# Patient Record
Sex: MALE | Race: WHITE
[De-identification: names, ages, dates, MRNs, and addresses within clinical notes are randomized per-mention and may not be internally consistent; named-entity substitution may affect disease eponyms.]

---

## 2019-01-10 ENCOUNTER — HOSPITAL ENCOUNTER (EMERGENCY)
Dept: HOSPITAL 91 - FTE | Age: 49
Discharge: HOME | End: 2019-01-10
Payer: MEDICAID

## 2019-01-10 ENCOUNTER — HOSPITAL ENCOUNTER (EMERGENCY)
Dept: HOSPITAL 10 - FTE | Age: 49
Discharge: HOME | End: 2019-01-10
Payer: MEDICAID

## 2019-01-10 VITALS — RESPIRATION RATE: 18 BRPM | DIASTOLIC BLOOD PRESSURE: 77 MMHG | HEART RATE: 84 BPM | SYSTOLIC BLOOD PRESSURE: 127 MMHG

## 2019-01-10 VITALS
BODY MASS INDEX: 33.87 KG/M2 | WEIGHT: 198.42 LBS | HEIGHT: 64 IN | HEIGHT: 64 IN | WEIGHT: 198.42 LBS | BODY MASS INDEX: 33.87 KG/M2

## 2019-01-10 DIAGNOSIS — J81.1: ICD-10-CM

## 2019-01-10 DIAGNOSIS — I50.9: ICD-10-CM

## 2019-01-10 DIAGNOSIS — J06.9: Primary | ICD-10-CM

## 2019-01-10 DIAGNOSIS — Z95.1: ICD-10-CM

## 2019-01-10 DIAGNOSIS — Z87.891: ICD-10-CM

## 2019-01-10 DIAGNOSIS — I11.0: ICD-10-CM

## 2019-01-10 LAB
ADD MAN DIFF?: NO
ALANINE AMINOTRANSFERASE: 35 IU/L (ref 13–69)
ALBUMIN/GLOBULIN RATIO: 1.34
ALBUMIN: 4.7 G/DL (ref 3.3–4.9)
ALKALINE PHOSPHATASE: 55 IU/L (ref 42–121)
ANION GAP: 14 (ref 5–13)
ASPARTATE AMINO TRANSFERASE: 44 IU/L (ref 15–46)
B-TYPE NATRIURETIC PEPTIDE: 2400 PG/ML (ref 0–125)
BASOPHIL #: 0.1 10^3/UL (ref 0–0.1)
BASOPHILS %: 1.2 % (ref 0–2)
BILIRUBIN,DIRECT: 0 MG/DL (ref 0–0.2)
BILIRUBIN,TOTAL: 0.4 MG/DL (ref 0.2–1.3)
BLOOD UREA NITROGEN: 15 MG/DL (ref 7–20)
CALCIUM: 9.2 MG/DL (ref 8.4–10.2)
CARBON DIOXIDE: 27 MMOL/L (ref 21–31)
CHLORIDE: 102 MMOL/L (ref 97–110)
CREATININE: 0.95 MG/DL (ref 0.61–1.24)
EOSINOPHILS #: 0.2 10^3/UL (ref 0–0.5)
EOSINOPHILS %: 2.1 % (ref 0–7)
GLOBULIN: 3.5 G/DL (ref 1.3–3.2)
GLUCOSE: 107 MG/DL (ref 70–220)
HEMATOCRIT: 43.7 % (ref 42–52)
HEMOGLOBIN: 14.3 G/DL (ref 14–18)
IMMATURE GRANS #M: 0.03 10^3/UL (ref 0–0.03)
IMMATURE GRANS % (M): 0.4 % (ref 0–0.43)
LYMPHOCYTES #: 1.6 10^3/UL (ref 0.8–2.9)
LYMPHOCYTES %: 21.5 % (ref 15–51)
MEAN CORPUSCULAR HEMOGLOBIN: 28.4 PG (ref 29–33)
MEAN CORPUSCULAR HGB CONC: 32.7 G/DL (ref 32–37)
MEAN CORPUSCULAR VOLUME: 86.7 FL (ref 82–101)
MEAN PLATELET VOLUME: 9.3 FL (ref 7.4–10.4)
MONOCYTE #: 0.8 10^3/UL (ref 0.3–0.9)
MONOCYTES %: 10.4 % (ref 0–11)
NEUTROPHIL #: 4.9 10^3/UL (ref 1.6–7.5)
NEUTROPHILS %: 64.4 % (ref 39–77)
NUCLEATED RED BLOOD CELLS #: 0 10^3/UL (ref 0–0)
NUCLEATED RED BLOOD CELLS%: 0 /100WBC (ref 0–0)
PLATELET COUNT: 229 10^3/UL (ref 140–415)
POTASSIUM: 4.3 MMOL/L (ref 3.5–5.1)
RED BLOOD COUNT: 5.04 10^6/UL (ref 4.7–6.1)
RED CELL DISTRIBUTION WIDTH: 13.7 % (ref 11.5–14.5)
SODIUM: 143 MMOL/L (ref 135–144)
TOTAL PROTEIN: 8.2 G/DL (ref 6.1–8.1)
TROPONIN-I: < 0.012 NG/ML (ref 0–0.12)
WHITE BLOOD COUNT: 7.6 10^3/UL (ref 4.8–10.8)

## 2019-01-10 PROCEDURE — 99285 EMERGENCY DEPT VISIT HI MDM: CPT

## 2019-01-10 PROCEDURE — 83880 ASSAY OF NATRIURETIC PEPTIDE: CPT

## 2019-01-10 PROCEDURE — 71046 X-RAY EXAM CHEST 2 VIEWS: CPT

## 2019-01-10 PROCEDURE — 93005 ELECTROCARDIOGRAM TRACING: CPT

## 2019-01-10 PROCEDURE — 80053 COMPREHEN METABOLIC PANEL: CPT

## 2019-01-10 PROCEDURE — 85025 COMPLETE CBC W/AUTO DIFF WBC: CPT

## 2019-01-10 PROCEDURE — 84484 ASSAY OF TROPONIN QUANT: CPT

## 2019-01-10 NOTE — ERD
ER Documentation


Chief Complaint


Chief Complaint





flu like symptoms





HPI


48-year-old male presents for flulike symptoms times 2 months.  Patient states 


that he has cough with production of phlegm.  He states that he has subjective 


fevers also.  He has some decreased appetite.  Patient also notes that he is 


having shortness of breath with walking.  Patient does have a history of MI with


history of open heart surgery.  He does however have cardiology following.  


States that he did take NyQuil and Tylenol at home with only mild relief.  No 


other modifying factors noted.  He denies any chest pain, abdominal pain, 


nausea, vomiting.





ROS


All systems reviewed and are negative except as per history of present illness.





Medications


Home Meds


Active Scripts


D-Methorphan Hb/P-Epd HCl/Bpm (Obhgxcsint-Kckkwezhqnn-Vd Syr) 118 Ml Syrup, 5 ML


PO Q4H PRN for COUGH, #1 BOTTLE


   Prov:ELAINE WOLF          1/10/19


Furosemide* (Lasix*) 20 Mg Tablet, 60 MG PO DAILY for chf for 3 Days, #9 TAB


   Prov:ELAINE WOLF          1/10/19


Furosemide* (Furosemide*) 40 Mg Tablet, 40 MG PO DAILY for CHF, #30 TAB


   Prov:ELAINE WOLF          1/10/19





Allergies


Allergies:  


Coded Allergies:  


     No Known Allergy (Unverified , 1/14/19)





PMhx/Soc


History of Surgery:  Yes (cabg w/stent)


Anesthesia Reaction:  No


Hx Neurological Disorder:  No


Hx Respiratory Disorders:  No


Hx Cardiac Disorders:  Yes (chf;htn)


Hx Psychiatric Problems:  No


Hx Miscellaneous Medical Probl:  No


Hx Alcohol Use:  No


Hx Substance Use:  No


Hx Tobacco Use:  No


Smoking Status:  Former smoker





Physical Exam


Vitals





Vital Signs


  Date      Temp  Pulse  Resp  B/P (MAP)   Pulse Ox  O2          O2 Flow    FiO2


Time                                                 Delivery    Rate


   1/10/19  98.2     84    18      127/77        97  Room Air


     21:13                           (94)


   1/10/19  98.7     80    19      119/75        98


     16:53                           (90)





Physical Exam


Const:   No acute distress


Head:   Atraumatic 


Eyes:    Normal Conjunctiva


ENT:    Tympanic membrane intact bilaterally, no bulging TM, no erythema noted, 


nasal mucosa moist without erythema, oral mucosa without erythema, no tonsillar 


exudates.


Neck:           Full range of motion. No meningismus.


Resp:   Clear to auscultation bilaterally, no wheezing


Cardio:   Regular rate and rhythm, no murmurs, no JVD noted, no abdominal bruit,


no lower extremity edema


Abd:    Soft, non tender, non distended. Normal bowel sounds


Skin:   No petechiae or rashes


Ext:    No cyanosis, or edema


Neur:   Awake and alert


Psych:    Normal Mood and Affect


Result Diagram:  


1/10/19 1916                                                                    


           1/10/19 1916





Results 24 hrs





Laboratory Tests


              Test
                                 1/10/19
19:16


              White Blood Count                      7.6 10^3/ul


              Red Blood Count                       5.04 10^6/ul


              Hemoglobin                               14.3 g/dl


              Hematocrit                                  43.7 %


              Mean Corpuscular Volume                    86.7 fl


              Mean Corpuscular Hemoglobin                28.4 pg


              Mean Corpuscular Hemoglobin
Concent     32.7 g/dl 



              Red Cell Distribution Width                 13.7 %


              Platelet Count                         229 10^3/UL


              Mean Platelet Volume                        9.3 fl


              Immature Granulocytes %                    0.400 %


              Neutrophils %                               64.4 %


              Lymphocytes %                               21.5 %


              Monocytes %                                 10.4 %


              Eosinophils %                                2.1 %


              Basophils %                                  1.2 %


              Nucleated Red Blood Cells %            0.0 /100WBC


              Immature Granulocytes #              0.030 10^3/ul


              Neutrophils #                          4.9 10^3/ul


              Lymphocytes #                          1.6 10^3/ul


              Monocytes #                            0.8 10^3/ul


              Eosinophils #                          0.2 10^3/ul


              Basophils #                            0.1 10^3/ul


              Nucleated Red Blood Cells #            0.0 10^3/ul


              Sodium Level                            143 mmol/L


              Potassium Level                         4.3 mmol/L


              Chloride Level                          102 mmol/L


              Carbon Dioxide Level                     27 mmol/L


              Anion Gap                                       14


              Blood Urea Nitrogen                       15 mg/dl


              Creatinine                              0.95 mg/dl


              Est Glomerular Filtrat Rate
mL/min   > 60 mL/min 



              Glucose Level                            107 mg/dl


              Calcium Level                            9.2 mg/dl


              Total Bilirubin                          0.4 mg/dl


              Direct Bilirubin                        0.00 mg/dl


              Indirect Bilirubin                       0.4 mg/dl


              Aspartate Amino Transf
(AST/SGOT)         44 IU/L 



              Alanine Aminotransferase
(ALT/SGPT)       35 IU/L 



              Alkaline Phosphatase                       55 IU/L


              Troponin I                           < 0.012 ng/ml


              B-Type Natriuretic Peptide              2400 PG/ML


              Total Protein                             8.2 g/dl


              Albumin                                   4.7 g/dl


              Globulin                                 3.50 g/dl


              Albumin/Globulin Ratio                        1.34








Procedures/MDM


Medical Decision Making:





Differential diagnosis includes but not limited to upper respiratory infection, 


CHF exacerbation, pneumonia, sepsis. 





Patient appears well on examination.





Given the patient was complaining of shortness of breath with walking basic labs


and BNP as well as troponin was checked.





EKG was unremarkable, there is no ST elevation or T wave changes suggest an 


acute MI


Chest x-ray showed enlarged cardiac silhouette with findings that may suggest 


pulmonary edema





CBC showed no anemia, no elevated WBC to suggest infection


CMP showed normal electrolytes, normal renal and liver function


BNP was 2400


Troponin was negative





The patient had elevated BNP and findings on chest x-ray that suggested pulmon


radha edema patient's lungs were clear to auscultation, there is no lower 


extremity edema, there is no JVD to suggest an acute CHF exacerbation.





Patient was taking Lasix at home for congestive heart failure however he states 


that he has not taken the medication for a few days.  Patient generally takes 40


mg daily of Lasix.  Patient was given a refill for his Lasix.  He was advised to


take 60 mg daily for the next 3 days and continue with the 40 mg daily as 


prescribed by his cardiologist.  Patient encouraged to follow with his 


cardiologist.  





Patient likely has an upper respiratory infection that may have caused a mild 


exacerbation of his congestive heart failure.


Patient felt stable for outpatient management.





Patient advised to follow up with PCP in 1-2 days. Patient advised to return to 


ED for new or worsening symptoms. Patient stable on discharge from the ED.











Disclaimer: Inadvertent spelling and grammatical errors are likely due to 


EHR/dictation software use and do not reflect on the overall quality of patient 


care. Also, please note that the electronic time recorded on this note does not 


necessarily reflect the actual time of the patient encounter.





Departure


Diagnosis:  


   Primary Impression:  


   Upper respiratory infection


   URI type:  unspecified URI  Qualified Codes:  J06.9 - Acute upper respiratory


   infection, unspecified


   Additional Impression:  


   Pulmonary edema


   Chronicity:  chronic  Qualified Codes:  J81.1 - Chronic pulmonary edema


Condition:  Fair


Patient Instructions:  Preventing Common Respiratory Infections


Referrals:  


[facility]


YOU HAVE RECEIVED A MEDICAL SCREENING EXAM AND THE RESULTS INDICATE THAT YOU DO 


NOT HAVE A CONDITION THAT REQUIRES URGENT TREATMENT IN THE EMERGENCY DEPARTMENT.





FURTHER EVALUATION AND TREATMENT OF YOUR CONDITION CAN WAIT UNTIL YOU ARE SEEN 


IN YOUR DOCTORS OFFICE WITHIN THE NEXT 1-2 DAYS. IT IS YOUR RESPONSIBILITY TO 


MAKE AN APPOINTMENT FOR FOLOW-UP CARE.





IF YOU HAVE A PRIMARY DOCTOR


--you should call your primary doctor and schedule an appointment





IF YOU DO NOT HAVE A PRIMARY DOCTOR YOU CAN CALL OUR PHYSICIAN REFERRAL HOTLINE 


AT


 [phone] 





IF YOU CAN NOT AFFORD TO SEE A PHYSICIAN YOU CAN CHOSE FROM THE FOLLOWING 


[facility]





[facility] [phone] [address] [facility] [phone] [address] [facility] [phone] [address] [facility] [phone] [address] [facility] [phone] [address] [facility]. [facility]. [phone] [address]





Additional Instructions:  


Llame al doctor MAANA y naseem mami JAYCE PARA DENTRO DE 1-2 LARA.Dgale a la 


secretaria que nosotros le instruimos hacer esta jayce.Avise o llame si bird 


condicin se empeora antes de la jayce. Regresa aqui si peor o no mejor.











ELAINE WOLF DO                 Wagner 10, 2019 20:58

## 2019-03-06 ENCOUNTER — HOSPITAL ENCOUNTER (INPATIENT)
Dept: HOSPITAL 54 - TELE1 | Age: 49
LOS: 3 days | Discharge: HOME | DRG: 139 | End: 2019-03-09
Attending: NURSE PRACTITIONER | Admitting: NURSE PRACTITIONER
Payer: MEDICAID

## 2019-03-06 VITALS — HEIGHT: 65 IN | BODY MASS INDEX: 30.99 KG/M2 | WEIGHT: 186 LBS

## 2019-03-06 VITALS — SYSTOLIC BLOOD PRESSURE: 120 MMHG | DIASTOLIC BLOOD PRESSURE: 80 MMHG

## 2019-03-06 VITALS — DIASTOLIC BLOOD PRESSURE: 80 MMHG | SYSTOLIC BLOOD PRESSURE: 120 MMHG

## 2019-03-06 DIAGNOSIS — R07.81: ICD-10-CM

## 2019-03-06 DIAGNOSIS — I25.10: ICD-10-CM

## 2019-03-06 DIAGNOSIS — Z95.1: ICD-10-CM

## 2019-03-06 DIAGNOSIS — J15.9: Primary | ICD-10-CM

## 2019-03-06 DIAGNOSIS — I27.20: ICD-10-CM

## 2019-03-06 DIAGNOSIS — I50.23: ICD-10-CM

## 2019-03-06 DIAGNOSIS — E66.9: ICD-10-CM

## 2019-03-06 DIAGNOSIS — I42.9: ICD-10-CM

## 2019-03-06 DIAGNOSIS — E86.0: ICD-10-CM

## 2019-03-06 PROCEDURE — G0378 HOSPITAL OBSERVATION PER HR: HCPCS

## 2019-03-06 RX ADMIN — SODIUM CHLORIDE PRN MLS/HR: 9 INJECTION, SOLUTION INTRAVENOUS at 22:31

## 2019-03-06 NOTE — NUR
TELE RN NOTES 

ADMITTED A48 Y/0 A/OX4  MALE Jordanian  SPEAKING  , ADMITTED  DIRECT FROM  [facility]  
WITH ADMITTING DX OF KERON, Jordanian  SPEAKING , AMBULATORY . PTS IS UNDER MEDICAL SERVICE 
OF ANAYA WHITLEY. ORDERS NOTED AND CARRIED OUT  ALL NEEDS ATTENDED TOO CALL LIGHT WITHIN 
REACH  V/S STABLE AFEBRILE , NO SOB NO DISTRESS NOTED ,BODY CHECK DONE  NOTED WITH LEFT 
EYEBROW ABRAISION, NO BLEEDING NOTED . ALL DUE MEDS GIVEN AS ORDERED,PTS ON R/A  SATING 97% 
ON TELE STATUS  SR AND OCCASIONAL PVC, WITH RIGHT AC AT 20 G INTACT AND PATENT , RIGHT HAND 
GAUGE  20 INTACT AND PATENT .WILL CONTINUE TO MONITOR PTS.PTS IS FULL CODE AND  NKDA  ,WITH 
IVF OF NS AT 75CC/HR  INFUSING WELL .

## 2019-03-07 VITALS — DIASTOLIC BLOOD PRESSURE: 77 MMHG | SYSTOLIC BLOOD PRESSURE: 107 MMHG

## 2019-03-07 VITALS — SYSTOLIC BLOOD PRESSURE: 95 MMHG | DIASTOLIC BLOOD PRESSURE: 65 MMHG

## 2019-03-07 VITALS — SYSTOLIC BLOOD PRESSURE: 120 MMHG | DIASTOLIC BLOOD PRESSURE: 74 MMHG

## 2019-03-07 VITALS — DIASTOLIC BLOOD PRESSURE: 76 MMHG | SYSTOLIC BLOOD PRESSURE: 109 MMHG

## 2019-03-07 VITALS — SYSTOLIC BLOOD PRESSURE: 106 MMHG | DIASTOLIC BLOOD PRESSURE: 73 MMHG

## 2019-03-07 VITALS — DIASTOLIC BLOOD PRESSURE: 65 MMHG | SYSTOLIC BLOOD PRESSURE: 106 MMHG

## 2019-03-07 VITALS — SYSTOLIC BLOOD PRESSURE: 106 MMHG | DIASTOLIC BLOOD PRESSURE: 55 MMHG

## 2019-03-07 LAB
BASOPHILS # BLD AUTO: 0 /CMM (ref 0–0.2)
BASOPHILS NFR BLD AUTO: 0.2 % (ref 0–2)
BUN SERPL-MCNC: 17 MG/DL (ref 7–18)
CALCIUM SERPL-MCNC: 8.4 MG/DL (ref 8.5–10.1)
CHLORIDE SERPL-SCNC: 101 MMOL/L (ref 98–107)
CHOLEST SERPL-MCNC: 143 MG/DL (ref ?–200)
CO2 SERPL-SCNC: 25 MMOL/L (ref 21–32)
CREAT SERPL-MCNC: 0.8 MG/DL (ref 0.6–1.3)
EOSINOPHIL NFR BLD AUTO: 0.1 % (ref 0–6)
GLUCOSE SERPL-MCNC: 140 MG/DL (ref 74–106)
HCT VFR BLD AUTO: 43 % (ref 39–51)
HDLC SERPL-MCNC: 29 MG/DL (ref 40–60)
HGB BLD-MCNC: 14.2 G/DL (ref 13.5–17.5)
LDLC SERPL DIRECT ASSAY-MCNC: 107 MG/DL (ref 0–99)
LYMPHOCYTES NFR BLD AUTO: 0.7 /CMM (ref 0.8–4.8)
LYMPHOCYTES NFR BLD AUTO: 7.4 % (ref 20–44)
MAGNESIUM SERPL-MCNC: 2.5 MG/DL (ref 1.8–2.4)
MCHC RBC AUTO-ENTMCNC: 33 G/DL (ref 31–36)
MCV RBC AUTO: 84 FL (ref 80–96)
MONOCYTES NFR BLD AUTO: 0.2 /CMM (ref 0.1–1.3)
MONOCYTES NFR BLD AUTO: 2.8 % (ref 2–12)
NEUTROPHILS # BLD AUTO: 7.9 /CMM (ref 1.8–8.9)
NEUTROPHILS NFR BLD AUTO: 89.5 % (ref 43–81)
PHOSPHATE SERPL-MCNC: 4.5 MG/DL (ref 2.5–4.9)
PLATELET # BLD AUTO: 200 /CMM (ref 150–450)
POTASSIUM SERPL-SCNC: 4.4 MMOL/L (ref 3.5–5.1)
RBC # BLD AUTO: 5.14 MIL/UL (ref 4.5–6)
SODIUM SERPL-SCNC: 134 MMOL/L (ref 136–145)
TRIGL SERPL-MCNC: 80 MG/DL (ref 30–150)
WBC NRBC COR # BLD AUTO: 8.8 K/UL (ref 4.3–11)

## 2019-03-07 RX ADMIN — DEXTROSE MONOHYDRATE SCH MLS/HR: 50 INJECTION, SOLUTION INTRAVENOUS at 21:16

## 2019-03-07 RX ADMIN — METOPROLOL TARTRATE SCH MG: 25 TABLET, FILM COATED ORAL at 09:12

## 2019-03-07 RX ADMIN — SODIUM CHLORIDE PRN MLS/HR: 9 INJECTION, SOLUTION INTRAVENOUS at 11:47

## 2019-03-07 RX ADMIN — DEXTROSE MONOHYDRATE SCH MLS/HR: 50 INJECTION, SOLUTION INTRAVENOUS at 01:11

## 2019-03-07 RX ADMIN — LISINOPRIL SCH MG: 5 TABLET ORAL at 09:13

## 2019-03-07 RX ADMIN — ATORVASTATIN CALCIUM SCH MG: 10 TABLET, FILM COATED ORAL at 21:16

## 2019-03-07 RX ADMIN — FUROSEMIDE SCH MG: 40 TABLET ORAL at 09:12

## 2019-03-07 RX ADMIN — METOPROLOL TARTRATE SCH MG: 25 TABLET, FILM COATED ORAL at 16:13

## 2019-03-07 RX ADMIN — PANTOPRAZOLE SODIUM SCH MG: 40 TABLET, DELAYED RELEASE ORAL at 09:14

## 2019-03-07 RX ADMIN — ASPIRIN 81 MG SCH MG: 81 TABLET ORAL at 09:11

## 2019-03-07 NOTE — NUR
TELE RN NOTE 

 PER SERENA RN NP OK TO PLACE DVT PUMPS BUT NO CHEMICAL ANTICOAGULANT AT  THIS TIME ,PATIENT  
AMBULATE WELL

## 2019-03-07 NOTE — NUR
Tele/RN - CTCA

Patient tolerated CTCA well, no adverse reaction seen, denies chest pain, stable on room 
air, SR with PVCs on the monitor. After procedure instructions given to patient and he 
verbalized full understanding. Endorsed to primary RN accordingly.

## 2019-03-07 NOTE — NUR
EDSON LI NOTE 

 SEEN BY SERENA DUBOSE TO DO FLU VACCINE AND PNA VACCINE ALSO OK TO PLACE DVT PUMPS 

-------------------------------------------------------------------------------

Addendum: 03/07/19 at 1103 by IGOR GREEN RN

-------------------------------------------------------------------------------

OK TO DO PNA VACCINE UPON DISCHARGE PER SERENA LI NP

## 2019-03-07 NOTE — NUR
TELE RN NOTE 

 ORTHOSTATIC BP DONE SUPPING BP 95/54 SITTING 103/77 STANDING 104/77 DR HA NOTIFIED ALSO 
EKG DONE AS ORDERED

## 2019-03-07 NOTE — NUR
TELE RN NOTE 

 RECEIVED PATIENT ON RA , ALERT,ORIENTED X3, ON TELE MONITOR SR ,  ON IVF AS ORDERED , BED 
IN LOWEST AND LOCKED POSITION , NO C\O DISCOMFORT AT THIS TIME,ON RA ,NO SOB NOTED AT THIS 
TIME , CALL LIGHT WITHIN REACH , PLAN OF CARE DISCUSSED WITH PATIENT

## 2019-03-07 NOTE — NUR
TELE RN NOTE 

PATIENT ALERT ORIENTED, BACK ROM CTA , ABLE TO EAT WELL ,NOT IN ACUTE DISTRESS NO C\0 CHEST 
PAIN ,ON IVF, BOTH LEGS WITH DVT PUMPS, WILL CONT TO MONITOR CLOSELY, ATE WELL LUNCH 
,ENCOURAGE TO DRINK   FLUIDS, PATIENT HAD CTA

## 2019-03-07 NOTE — NUR
RN CTCA notes

Patient is alert and oriented x 4, denies chest pain, not in any form of distress. Consent 
for Coronary CT angiography signed by the patient. Pre-procedure teachings given and he 
verbalized understanding. Will monitor patient per protocol.

## 2019-03-08 VITALS — SYSTOLIC BLOOD PRESSURE: 107 MMHG | DIASTOLIC BLOOD PRESSURE: 75 MMHG

## 2019-03-08 VITALS — DIASTOLIC BLOOD PRESSURE: 67 MMHG | SYSTOLIC BLOOD PRESSURE: 100 MMHG

## 2019-03-08 VITALS — DIASTOLIC BLOOD PRESSURE: 83 MMHG | SYSTOLIC BLOOD PRESSURE: 106 MMHG

## 2019-03-08 VITALS — DIASTOLIC BLOOD PRESSURE: 85 MMHG | SYSTOLIC BLOOD PRESSURE: 112 MMHG

## 2019-03-08 VITALS — DIASTOLIC BLOOD PRESSURE: 81 MMHG | SYSTOLIC BLOOD PRESSURE: 104 MMHG

## 2019-03-08 VITALS — SYSTOLIC BLOOD PRESSURE: 108 MMHG | DIASTOLIC BLOOD PRESSURE: 85 MMHG

## 2019-03-08 RX ADMIN — SODIUM CHLORIDE PRN MLS/HR: 9 INJECTION, SOLUTION INTRAVENOUS at 03:37

## 2019-03-08 RX ADMIN — PANTOPRAZOLE SODIUM SCH MG: 40 TABLET, DELAYED RELEASE ORAL at 07:37

## 2019-03-08 RX ADMIN — METOPROLOL TARTRATE SCH MG: 25 TABLET, FILM COATED ORAL at 09:15

## 2019-03-08 RX ADMIN — ASPIRIN 81 MG SCH MG: 81 TABLET ORAL at 09:14

## 2019-03-08 RX ADMIN — FUROSEMIDE SCH MG: 40 TABLET ORAL at 09:15

## 2019-03-08 RX ADMIN — ATORVASTATIN CALCIUM SCH MG: 10 TABLET, FILM COATED ORAL at 21:25

## 2019-03-08 RX ADMIN — METOPROLOL TARTRATE SCH MG: 25 TABLET, FILM COATED ORAL at 17:07

## 2019-03-08 RX ADMIN — LISINOPRIL SCH MG: 5 TABLET ORAL at 09:16

## 2019-03-08 NOTE — NUR
MS RN

RECEIVE PT IN BED. A/O X3.  RESPIRATIONS EVEN AND UNLABORED, NO SOB NOTED, NO DISTRESS, 
SAFETY MEASURES IN PLACE. WILL CONTINUE TO MONITOR.

## 2019-03-08 NOTE — NUR
INITIAL

AWAKE & RESPONSIVE ALERT X 4. NO DISTRESS NOTED. NO SOB NOTED EITHER. DENIES ANY PAIN OR 
DISCOMFORT AT THIS TIME. ON TELE SR WITH IVF INFUSING NORMAL SALINE AT 75 ML/HR WELL. 
MONITORED ACCORDINGLY. CALL LIGHT WITHIN REACH. BED IN LOWEST POSITION. SR UP X 2 FOR 
SAFETY. WILL CONTINUE TO MONITOR.

## 2019-03-08 NOTE — NUR
CLOSING

PT NEEDS MEET PIV LINES CHANGED TO H/L PT KEEP SAFE HAD 2 d ECHO TODAY

WILL ENDORSE TO NEXT SHIFT RN.

## 2019-03-08 NOTE — NUR
TELE RN NOTES



AWAKE & RESPONSIVE. NOT IN ANY DISTRESS. NO SOB NOTED. DENIES ANY PAIN OR DISCOMFORT AT THIS 
TIME. ON TELE SR @ 70 WITH BBB WITH IVF INFUSING WELL. MONITORED ACCORDINGLY. CALL LIGHT 
WITHIN REACH. BED IN LOWEST POSITION. SR UP X 2 FOR SAFETY. WILL ENDORSE TO NEXT SHIFT.

## 2019-03-09 VITALS — DIASTOLIC BLOOD PRESSURE: 76 MMHG | SYSTOLIC BLOOD PRESSURE: 111 MMHG

## 2019-03-09 VITALS — SYSTOLIC BLOOD PRESSURE: 100 MMHG | DIASTOLIC BLOOD PRESSURE: 74 MMHG

## 2019-03-09 VITALS — DIASTOLIC BLOOD PRESSURE: 79 MMHG | SYSTOLIC BLOOD PRESSURE: 107 MMHG

## 2019-03-09 RX ADMIN — LISINOPRIL SCH MG: 5 TABLET ORAL at 08:17

## 2019-03-09 RX ADMIN — PANTOPRAZOLE SODIUM SCH MG: 40 TABLET, DELAYED RELEASE ORAL at 08:15

## 2019-03-09 RX ADMIN — ASPIRIN 81 MG SCH MG: 81 TABLET ORAL at 08:15

## 2019-03-09 RX ADMIN — METOPROLOL TARTRATE SCH MG: 25 TABLET, FILM COATED ORAL at 08:16

## 2019-03-09 RX ADMIN — FUROSEMIDE SCH MG: 40 TABLET ORAL at 08:15

## 2019-03-09 NOTE — NUR
RN CLOSING NOTE



ASLEEP AND EASILY AWAKEN. NOT IN DISTRESS, STABLE. AM CARE PROVIDED. NEEDS ATTENDED AND 
ANTICIPATED,  KEPT CLEAN AND DRY AND COMFORT. NURSING CARE RENDERED, SAFETY MEASURES IN 
PLACE, BED IN LOW LOCKED POSITION, CALL LIGHT WITHIN EASY REACH. ENDORSE TO NEXT SHIFT 
CONTINUITY OF CARE.

## 2019-03-09 NOTE — NUR
RN NOTES

PT STABLE AT DISCHARGE. SERENA SANDS EXPLAINED TO THE PATIENT WITH THE HELP OF  THE 
IMPORTANCE OF THE PATIENT FOLLOWING UP WITH A CARDIOLOGIST. PATIENT VERBALLY ACKNOWLEDGED 
UNDERSTANDING. PT WILL NEED TO HAVE DEFIBRILLATOR. ALL PATIENT BELONGINGS ACCOUNTED FOR AND 
TAKEN HOME WITH PATIENT AT DISCHARGE. PHOTOGRAPHS TAKEN AT DISCHARGE. ALL DISCHARGE 
PAPERWORK EXPLAINED, PATIENT VERBALLY ACKNOWLEDGED UNDERSTANDING. ALL PAPERWORK SIGNED, 
COPIED AND GIVEN TO THE PATIENT. PT ACCOMPANIED BY CNA AND WIFE OFF OF THE UNIT AT 1330 VIA 
WHEELCHAIR.

## 2019-03-09 NOTE — NUR
RN OPENING NOTES

PT AWAKE AND RESTING IN BED. PT COMPLAINS OF ABDOMINAL PAIN, INFORMED PT OF PRN PAIN 
MEDICATIONS AVAILABLE TO HIM. AT THIS TIME PT STATED WILL LET NURSE KNOW WHEN PAIN 
MEDICATION NEEDED. WILL FOLLOW UP. PT HAS RIGHT AC #20 INTACT AND RIGHT FOREARM #18 INTACT 
AND PATENT. CASE MANAGEMENT WILL FOLLOW UP WITH PLACEMENT OF PACEMAKER. SAFETY PRECAUTIONS 
IN PLACE, BED IN LOWEST LOCKED POSITION, X2 SIDE RAILS UP AND CALL LIGHT WITHIN REACH. WILL 
CONTINUE TO MONITOR.